# Patient Record
Sex: FEMALE | Race: BLACK OR AFRICAN AMERICAN
[De-identification: names, ages, dates, MRNs, and addresses within clinical notes are randomized per-mention and may not be internally consistent; named-entity substitution may affect disease eponyms.]

---

## 2021-05-06 ENCOUNTER — HOSPITAL ENCOUNTER (INPATIENT)
Dept: HOSPITAL 53 - M ED | Age: 21
LOS: 4 days | Discharge: HOME | DRG: 885 | End: 2021-05-10
Attending: PSYCHIATRY & NEUROLOGY | Admitting: PSYCHIATRY & NEUROLOGY
Payer: COMMERCIAL

## 2021-05-06 VITALS — HEIGHT: 65 IN | WEIGHT: 140.65 LBS | BODY MASS INDEX: 23.43 KG/M2

## 2021-05-06 DIAGNOSIS — Z79.899: ICD-10-CM

## 2021-05-06 DIAGNOSIS — T43.292A: ICD-10-CM

## 2021-05-06 DIAGNOSIS — F32.2: Primary | ICD-10-CM

## 2021-05-06 DIAGNOSIS — R25.2: ICD-10-CM

## 2021-05-06 DIAGNOSIS — Z20.822: ICD-10-CM

## 2021-05-06 DIAGNOSIS — Z91.048: ICD-10-CM

## 2021-05-06 DIAGNOSIS — Z88.2: ICD-10-CM

## 2021-05-06 DIAGNOSIS — Z63.4: ICD-10-CM

## 2021-05-06 LAB
ALBUMIN SERPL BCG-MCNC: 4.5 GM/DL (ref 3.2–5.2)
ALT SERPL W P-5'-P-CCNC: 18 U/L (ref 12–78)
AMPHETAMINES UR QL SCN: NEGATIVE
APAP SERPL-MCNC: < 2 UG/ML (ref 10–30)
B-HCG SERPL QL: NEGATIVE
BARBITURATES UR QL SCN: NEGATIVE
BASOPHILS # BLD AUTO: 0.1 10^3/UL (ref 0–0.2)
BASOPHILS NFR BLD AUTO: 0.5 % (ref 0–1)
BENZODIAZ UR QL SCN: NEGATIVE
BILIRUB CONJ SERPL-MCNC: 0.2 MG/DL (ref 0–0.2)
BILIRUB SERPL-MCNC: 0.5 MG/DL (ref 0.2–1)
BUN SERPL-MCNC: 10 MG/DL (ref 7–18)
BZE UR QL SCN: NEGATIVE
CALCIUM SERPL-MCNC: 9.8 MG/DL (ref 8.5–10.1)
CANNABINOIDS UR QL SCN: POSITIVE
CHLORIDE SERPL-SCNC: 107 MEQ/L (ref 98–107)
CK SERPL-CCNC: 172 U/L (ref 26–192)
CO2 SERPL-SCNC: 25 MEQ/L (ref 21–32)
CREAT SERPL-MCNC: 0.83 MG/DL (ref 0.55–1.3)
EOSINOPHIL # BLD AUTO: 0 10^3/UL (ref 0–0.5)
EOSINOPHIL NFR BLD AUTO: 0.4 % (ref 0–3)
ETHANOL SERPL-MCNC: < 0.003 % (ref 0–0.01)
GFR SERPL CREATININE-BSD FRML MDRD: > 60 ML/MIN/{1.73_M2} (ref 60–?)
GLUCOSE SERPL-MCNC: 85 MG/DL (ref 70–100)
HCT VFR BLD AUTO: 42.6 % (ref 36–47)
HGB BLD-MCNC: 14 G/DL (ref 12–15.5)
LYMPHOCYTES # BLD AUTO: 2.3 10^3/UL (ref 1.5–5)
LYMPHOCYTES NFR BLD AUTO: 22.5 % (ref 24–44)
MCH RBC QN AUTO: 29.6 PG (ref 27–33)
MCHC RBC AUTO-ENTMCNC: 32.9 G/DL (ref 32–36.5)
MCV RBC AUTO: 90.1 FL (ref 80–96)
METHADONE UR QL SCN: NEGATIVE
MONOCYTES # BLD AUTO: 0.9 10^3/UL (ref 0–0.8)
MONOCYTES NFR BLD AUTO: 8.4 % (ref 2–8)
NEUTROPHILS # BLD AUTO: 6.9 10^3/UL (ref 1.5–8.5)
NEUTROPHILS NFR BLD AUTO: 67.9 % (ref 36–66)
OPIATES UR QL SCN: NEGATIVE
PCP UR QL SCN: NEGATIVE
PLATELET # BLD AUTO: 233 10^3/UL (ref 150–450)
POTASSIUM SERPL-SCNC: 3.8 MEQ/L (ref 3.5–5.1)
PROT SERPL-MCNC: 8.3 GM/DL (ref 6.4–8.2)
RBC # BLD AUTO: 4.73 10^6/UL (ref 4–5.4)
RSV RNA NPH QL NAA+PROBE: NEGATIVE
SALICYLATES SERPL-MCNC: < 1.7 MG/DL (ref 5–30)
SODIUM SERPL-SCNC: 139 MEQ/L (ref 136–145)
TSH SERPL DL<=0.005 MIU/L-ACNC: 0.49 UIU/ML (ref 0.36–3.74)
WBC # BLD AUTO: 10.2 10^3/UL (ref 4–10)

## 2021-05-06 SDOH — SOCIAL STABILITY - SOCIAL INSECURITY: DISSAPEARANCE AND DEATH OF FAMILY MEMBER: Z63.4

## 2021-05-07 VITALS — DIASTOLIC BLOOD PRESSURE: 78 MMHG | SYSTOLIC BLOOD PRESSURE: 122 MMHG

## 2021-05-07 VITALS — SYSTOLIC BLOOD PRESSURE: 116 MMHG | DIASTOLIC BLOOD PRESSURE: 61 MMHG

## 2021-05-07 RX ADMIN — PRAZOSIN HYDROCHLORIDE SCH MG: 1 CAPSULE ORAL at 21:24

## 2021-05-07 NOTE — ECGEPIP
[facility] - ED

                                       

                                       Test Date:    2021

Pat Name:     MARIA DEL ROSARIO MCCULLOUGH               Department:   

Patient ID:   [id number]                 Room:         -

Gender:       Female                   Technician:   OSCAR

:          2000               Requested By: Margie Bose 

Order Number: [id number]     Reading MD:   Billy Kim

                                 Measurements

Intervals                              Axis          

Rate:         84                       P:            47

MO:           144                      QRS:          76

QRSD:         74                       T:            18

QT:           372                                    

QTc:          439                                    

                           Interpretive Statements

Normal sinus rhythm

Comparison tracing not on file

Electronically Signed on 2021 4:20:31 EDT by Billy Kim

## 2021-05-07 NOTE — HPEPDOC
General


Date of Admission


May 7, 2021 at 04:03


Date of Service:  May 7, 2021


Chief Complaint


The patient is a 20-year-old female admitted with a reason for visit of 

Unspecified Depressive Do.


Source:  Patient, RN/MD





History of Present Illness


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 

depression with suicidal attempt.  She had overdosed on Duloxetine DR . Took 15 

capsules. She has had several deaths in the family recently which has been  

making her very depressed over the last few days.  u tox positive for marijuana.

I am seeing the patient for medical history and physical. Patient complains of 

mild abdominal cramps. Has not had a bowel movement in 2 days.





Home Medications


Scheduled


Duloxetine Hcl (Duloxetine HCl) 20 Mg Capsule.dr, 20 MG PO BID, (Reported)


Folic Acid/Multivit-Min/Lutein (Multi-Vitamin Gummies) 1 Each Tab.chew, 2 CHW PO

DAILY, (Reported)





Scheduled PRN


Trazodone HCl (Trazodone HCl) 50 Mg Tablet, 50 MG PO QHS PRN for INSOMNIA, 

(Reported)





Allergies


Coded Allergies:  


     Sulfa (Sulfonamide Antibiotics) (Verified  Allergy, Intermediate, Hives, 

5/6/21)


Uncoded Allergies:  


     G6PD (Allergy, Unknown, 5/6/21)





Past Medical History


Medical History


depression


anxiety


PTSD


back pain


Scoliosis


Adjustment disorder





Family History


Significant Family History:  Cancer, Diabetes, Hypertension





Social History


* Smoker:  Denies


Alcohol:  Denies


Drugs:  denies





A-FIB/CHADSVASC


A-FIB History


Current/History of A-Fib/PAF?:  No





Review of Systems


Constitutional:  Denies: Chills, Fever, Night Sweats


Eyes:  Denies: Pain, Vision change


ENT:  Denies: Head Aches, Ear Pain, Dysphagia


Skin:  Denies: Rash, Lesions, Breakdown


Pulmonary:  Denies: Dyspnea, Cough


Cardiovascular:  Denies: Chest Pain, Palpitations, Orthopnea, Paroxysmal Noc. 

Dyspnea, Lt Headedness


Gastrointestinal:  Reports: Abdominal Pain, Constipation


Genitourinary:  Denies: Dysuria, Frequency, Incontinence, Retention


Hematologic:  Denies: Bruising, Bleeding Excessively





Physical Examination


General Exam:  Positive: Alert, Cooperative, No Acute Distress


Eye Exam:  Positive: PERRLA, Conjunctiva & lids normal, EOMI; 


   Negative: Sclera icteric


ENT Exam:  Positive: Atraumatic, Mucous membr. moist/pink, Pharynx Normal


Neck Exam:  Positive: Supple; 


   Negative: JVD, thyromegaly


Chest Exam:  Positive: Clear to auscultation, Normal air movement


Heart Exam:  Positive: Rate Normal, Regular Rhythm, Normal S1, Normal S2; 


   Negative: Murmurs, Rubs


Abdomen Exam:  Positive: Normal bowel sounds, Soft; 


   Negative: Tenderness, Hepatospenomegaly





Vital Signs





Vital Signs








  Date Time  Temp Pulse Resp B/P (MAP) Pulse Ox O2 Delivery O2 Flow Rate FiO2


 


5/7/21 05:32 97.7 63 16 122/78 (93) 99 Room Air  











Laboratory Data


Labs 24H


Laboratory Tests 2


5/6/21 19:34: 


Immature Granulocyte % (Auto) 0.3, Neutrophils (%) (Auto) 67.9H, Lymphocytes (%)

(Auto) 22.5L, Monocytes (%) (Auto) 8.4H, Eosinophils (%) (Auto) 0.4, Basophils 

(%) (Auto) 0.5, Neutrophils # (Auto) 6.9, Lymphocytes # (Auto) 2.3, Monocytes # 

(Auto) 0.9H, Eosinophils # (Auto) 0.0, Basophils # (Auto) 0.1, Nucleated Red 

Blood Cells % (auto) 0.0, Anion Gap 7L, Glomerular Filtration Rate > 60.0, 

Calcium Level 9.8, Total Bilirubin 0.5, Direct Bilirubin 0.2, Aspartate Amino 

Transf (AST/SGOT) 16, Alanine Aminotransferase (ALT/SGPT) 18, Alkaline 

Phosphatase 75, Total Creatine Kinase 172, Total Protein 8.3H, Albumin 4.5, 

Albumin/Globulin Ratio 1.2, Thyroid Stimulating Hormone (TSH) 0.486, Human 

Chorionic Gonadotropin, Qual NEGATIVE, Salicylates Level < 1.7L, Urine Opiates 

Screen NEGATIVE, Urine Methadone Screen NEGATIVE, Acetaminophen Level < 2.0L, 

Urine Barbiturates Screen NEGATIVE, Urine Phencyclidine Screen NEGATIVE, Urine 

Amphetamines Screen NEGATIVE, Urine Benzodiazepines Screen NEGATIVE, Urine 

Cocaine Metabolite Screen NEGATIVE, Urine Cannabinoids Screen POSITIVEH, Ethyl 

Alcohol Level < 0.003


5/6/21 19:37: Bedside Glucose (Misc Panel) 87


5/6/21 20:59: 


Coronavirus (COVID-19)(PCR) NEGATIVE, Influenza Type A (RT-PCR) NEGATIVE, I

nfluenza Type B (RT-PCR) NEGATIVE, Respiratory Syncytial Virus (PCR) NEGATIVE


CBC/BMP


Laboratory Tests


5/6/21 19:34











 Assessment/Plan


20 year old female admitted to [facility] for unspecified depression. u tox positive 

for marijuana. I am seeing the patient for medical history and physical. 





Depression with suicidal gesture


as per psychiatry





Abdominal cramps 


likely related to constipation


has tylenol and MOM orderd. She had refused it.





Plan / VTE


VTE Prophylaxis Ordered?:  No











ROBERTO CASANOVA MD                    May 7, 2021 13:32

## 2021-05-08 VITALS — DIASTOLIC BLOOD PRESSURE: 57 MMHG | SYSTOLIC BLOOD PRESSURE: 119 MMHG

## 2021-05-08 VITALS — DIASTOLIC BLOOD PRESSURE: 64 MMHG | SYSTOLIC BLOOD PRESSURE: 115 MMHG

## 2021-05-08 RX ADMIN — PRAZOSIN HYDROCHLORIDE SCH MG: 1 CAPSULE ORAL at 20:44

## 2021-05-08 RX ADMIN — DULOXETINE SCH MG: 20 CAPSULE, DELAYED RELEASE ORAL at 20:45

## 2021-05-08 NOTE — MHIPNPDOC
[facility] Progress Note


Progress Note


DATE OF SERVICE: 21





HISTORY: Asper previous records: "Patient is a 20 -year-old , 

female, who reports taking an overdose of 15 Cymbalta as a suicide attempt.  She

reports being very depressed over grief of several family members over the 

course of a year.  She reports having nightmares of seeing her dead family 

members.  








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 

admits to taking about "10-15 of my depression medications" earlier tonight with

intention of harming self. Pt is quite, guarded, fair eye contact, admits to 

feeling "very depressed" for past few days. Pt reports she has had several 

recent deaths in the family including her Uncle and her Grandmother "who raised 

me". Pt adds    "alot of family stress back home right now", denies any other 

stressors. Pt reports she took pills and then called family back home who then 

contacted [facility] M.P.'s. Pt denies prior attempts or psych admissions, admits to hx 

of PTSD but does not elaborate, denies HI/AH/VH, denies any substance abuse 

issues. PT denies any family hx of suicide, reports "PTSD" in the family."





VITAL SIGNS: See below.





NEW TEST RESULTS: See below





CURRENT MEDICATIONS: See below.





MENTAL STATUS EXAMINATION:


General Appearance:  well groomed, appears stated age, hospital scrubs/clothing


Build:  average


Demeanor:  withdrawn, depressed


Eye Contact:  fair


Activity:  slowed, depressed


Behavior:  cooperative, withdrawn


Speech:  low in volume


Mood:  depressed, anxious


Affect:  flat


Thought Process:  logical/linear


Thought Content (Delusions):  none reported


Thought Content (Other):  reports having nightmares about the  members 

of her family


Thought Content (Aggressive):  none reported


Perception (Hallucinations):  denies


Cognition (Impairment of):  none reported


Cognition(Intelligence Est.):  average


Oriented:  Awake, Alert, Oriented times three


Insight:  fair


Judgment:  Fair


Psychosis:  Denies





Diagnoses


Major Depressive Disorder, Single Episode, Severe


S/P Suicide Attempt by Overdose


 


ASSESSMENT:  Will increase Cymbalta to 20 mgs PO BID. She is experiencing a 

complicated bereavement, she has been traumatized by this events ( losing family

members), she came back recently from [place] where her family resides 

and she says that going there has affected her a lot. She reports trying to 

avoid places or conversations about this subject because is too painful. Will 

continue to monitor





MANAGEMENT PLAN: As above





TIME SPENT: 20 minutes.





Vital Signs





Vital Signs








  Date Time  Temp Pulse Resp B/P (MAP) Pulse Ox O2 Delivery O2 Flow Rate FiO2


 


21 06:46 99.5 79 16 119/57 (77) 99 Room Air  











Current Medications





Current Medications








 Medications


  (Trade)  Dose


 Ordered  Sig/Elza


 Route


 PRN Reason  Start Time


 Stop Time Status Last Admin


Dose Admin


 


 Acetaminophen


  (Tylenol Tab)  650 mg  Q6HP  PRN


 PO


 HEADACHE or DISCOMFORT  21 04:05


     





 


 Al Hydrox/Mg


 Hydrox/Simethicone


  (Mylanta)  30 ml  Q4HP  PRN


 PO


 HEARTBURN/INDIGESTION  21 04:05


     





 


 Duloxetine HCl


  (Cymbalta)  20 mg  DAILY


 PO


   21 09:00


    21 09:15





 


 Home Med


  (Med Rec


 Complete!)    ASDIRECTED


 XX


   21 03:45


 21 03:42 DC  





 


 Hydroxyzine HCl


  (Atarax)  50 mg  Q6H  PRN


 PO


 ANXIETY  21 16:45


     





 


 Magnesium


 Hydroxide


  (Milk Of


 Magnesia)  30 ml  DAILYPRN  PRN


 PO


 CONSTIPATION  21 04:05


     





 


 Prazosin HCl


  (Minipress)  2 mg  QHS


 PO


   21 21:00


    21 21:24





 


 Trazodone HCl


  (Desyrel)  50 mg  QHSP  PRN


 PO


 INSOMNIA  21 04:05


     














Allergies


Coded Allergies:  


     Sulfa (Sulfonamide Antibiotics) (Verified  Allergy, Intermediate, Hives, 

21)


Uncoded Allergies:  


     G6PD (Allergy, Unknown, 21)











GUNNAR MONSALVE MD              May 8, 2021 14:07

## 2021-05-09 VITALS — DIASTOLIC BLOOD PRESSURE: 69 MMHG | SYSTOLIC BLOOD PRESSURE: 109 MMHG

## 2021-05-09 VITALS — SYSTOLIC BLOOD PRESSURE: 109 MMHG | DIASTOLIC BLOOD PRESSURE: 69 MMHG

## 2021-05-09 RX ADMIN — DULOXETINE SCH MG: 20 CAPSULE, DELAYED RELEASE ORAL at 20:25

## 2021-05-09 RX ADMIN — DULOXETINE SCH MG: 20 CAPSULE, DELAYED RELEASE ORAL at 08:29

## 2021-05-09 RX ADMIN — PRAZOSIN HYDROCHLORIDE SCH MG: 1 CAPSULE ORAL at 20:25

## 2021-05-09 NOTE — MHIPNPDOC
[facility] Progress Note


Progress Note


DATE OF SERVICE: 21





HISTORY: Asper previous records: "Patient is a 20 -year-old , 

female, who reports taking an overdose of 15 Cymbalta as a suicide attempt.  She

reports being very depressed over grief of several family members over the 

course of a year.  She reports having nightmares of seeing her dead family 

members.  








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 

admits to taking about "10-15 of my depression medications" earlier tonight with

intention of harming self. Pt is quite, guarded, fair eye contact, admits to 

feeling "very depressed" for past few days. Pt reports she has had several 

recent deaths in the family including her Uncle and her Grandmother "who raised 

me". Pt adds    "alot of family stress back home right now", denies any other 

stressors. Pt reports she took pills and then called family back home who then 

contacted FD M.P.'s. Pt denies prior attempts or psych admissions, admits to hx 

of PTSD but does not elaborate, denies HI/AH/VH, denies any substance abuse 

issues. PT denies any family hx of suicide, reports "PTSD" in the family."





VITAL SIGNS: See below.





NEW TEST RESULTS: See below





CURRENT MEDICATIONS: See below.





MENTAL STATUS EXAMINATION:


General Appearance:  well groomed, appears stated age, hospital scrubs/clothing


Build:  average


Demeanor:  Less withdrawn, more interactive


Eye Contact:  fair


Activity:  Still a little bit slow but improving


Behavior:  cooperative, interacting with this writer, establishing rapport


Speech:  low in volume


Mood:  anxious


Affect:  Congruent with mood, a little bit more reactive, smiles at times


Thought Process:  logical/linear


Thought Content (Delusions):  none reported


Thought Content (Other): She  still reports having nightmares about the 

members of her family


Thought Content (Aggressive):  none reported


Perception (Hallucinations):  denies


Cognition (Impairment of):  none reported


Cognition(Intelligence Est.):  average


Oriented:  Awake, Alert, Oriented times three


Insight:  fair


Judgment:  Fair


Psychosis:  Denies





Diagnoses


Major Depressive Disorder, Single Episode, Severe


S/P Suicide Attempt by Overdose


 


ASSESSMENT:  We will continue with the same treatment plan, she is responding to

Cymbalta 20 mg twice a day which is the dose that she was taking before she 

attempted suicide with the same medication. She has expressed guilty thoughts 

and feelings about his attempted suicide and she says she will never try to do 

this again. She seems to be determined mind to overcome her psychiatric problem.





MANAGEMENT PLAN: As above





TIME SPENT: 20 minutes.





Vital Signs





Vital Signs








  Date Time  Temp Pulse Resp B/P (MAP) Pulse Ox O2 Delivery O2 Flow Rate FiO2


 


21 06:14 97.9 59 18 109/69 (82) 99 Room Air  











Current Medications





Current Medications








 Medications


  (Trade)  Dose


 Ordered  Sig/Elza


 Route


 PRN Reason  Start Time


 Stop Time Status Last Admin


Dose Admin


 


 Acetaminophen


  (Tylenol Tab)  650 mg  Q6HP  PRN


 PO


 HEADACHE or DISCOMFORT  21 04:05


     





 


 Al Hydrox/Mg


 Hydrox/Simethicone


  (Mylanta)  30 ml  Q4HP  PRN


 PO


 HEARTBURN/INDIGESTION  21 04:05


     





 


 Duloxetine HCl


  (Cymbalta)  20 mg  BID


 PO


   21 21:00


    21 08:29





 


 Duloxetine HCl


  (Cymbalta)  20 mg  DAILY


 PO


   21 09:00


 21 14:17 DC 21 09:15





 


 Home Med


  (Med Rec


 Complete!)    ASDIRECTED


 XX


   21 03:45


 21 03:42 DC  





 


 Hydroxyzine HCl


  (Atarax)  50 mg  Q6H  PRN


 PO


 ANXIETY  21 16:45


     





 


 Magnesium


 Hydroxide


  (Milk Of


 Magnesia)  30 ml  DAILYPRN  PRN


 PO


 CONSTIPATION  21 04:05


     





 


 Prazosin HCl


  (Minipress)  2 mg  QHS


 PO


   21 21:00


    21 20:44





 


 Trazodone HCl


  (Desyrel)  50 mg  QHSP  PRN


 PO


 INSOMNIA  21 04:05


    21 20:44














Allergies


Coded Allergies:  


     Sulfa (Sulfonamide Antibiotics) (Verified  Allergy, Intermediate, Hives, 

21)


Uncoded Allergies:  


     G6PD (Allergy, Unknown, 21)











GUNNAR MONSALVE MD              May 9, 2021 16:55

## 2021-05-10 VITALS — DIASTOLIC BLOOD PRESSURE: 69 MMHG | SYSTOLIC BLOOD PRESSURE: 103 MMHG

## 2021-05-10 RX ADMIN — DULOXETINE SCH MG: 20 CAPSULE, DELAYED RELEASE ORAL at 08:07

## 2021-05-10 NOTE — MHDSPDOC
[facility] Discharge Summary


Discharge Summary


DATE OF ADMISSION: May 7, 2021 at 04:03 


DATE OF DISCHARGE: May 10, 2021 at 1008





DISCHARGE DIAGNOSES:


Major Depressive Disorder, Single Episode, Severe


S/P Suicide Attempt by Overdose





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 

, female, who reports taking an overdose of 15 Cymbalta as a 

suicide attempt "I took an overdose because everything was hitting me all at 

once."  She reports being very depressed over grief of several family members 

over the course of a year.  She reports having nightmares of seeing her dead 

family members.   





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 

admits to taking about "10-15 of my depression medications" earlier tonight with

intention of harming self. Pt is quite, guarded, fair eye contact, admits to 

feeling "very depressed" for past few days. Pt reports she has had several 

recent deaths in the family including her Uncle and her Grandmother "who raised 

me". Pt adds    "alot of family stress back home right now", denies any other 

stressors. Pt reports she took pills and then called family back home who then 

contacted [facility] M.P[facility]s. Pt denies prior attempts or psych admissions, admits to hx 

of PTSD but does not elaborate, denies HI/AH/VH, denies any substance abuse 

issues. PT denies any family hx of suicide, reports "PTSD" in the family.





VITALS: See below





CONSULTANTS INVOLVED: :  See H + P by Hospitalist





TREATMENT AND PROGRESS ON THE UNIT : Patient was admitted to the [facility] on a 9.39 

legal status he was afforded the following treatment modalities:


1) Individual Therapy


2) Group Therapy


3) Medication Management


4) Milieu Therapy


5) Safe Environment





HOSPITAL COURSE:  Patient was admitted to [facility] on a 9.39 legal status.  She had 

reported that she was having increased depression and anxiety due to the loss of

another family member.  She has lost 3 family members over the past year.  She 

reported that she had overdosed on 15 cymbalta as a suicide attempt. Patient was

agreeable to restarting Cymbalta.  This was increased to BID over the weekend.  

She had also complained of nightmares and she was Rx'd prazosin for this.  Today

she was interviewed,  She presented with bright mood and affect.  She reports a 

decrease in depression, anxiety, denies nightmares, reports that she is sleeping

well.  "I am in a much better head space than when I came in."  She states that 

she has support persons that she can utilize (mother, cousin, friends and [facility])  Her plan moving forward is to write (journal) and to [employer].  She 

had not been using any coping skills due to her bereavement. 





DISCHARGE ASSESSMENT:  In today's interview, patient is alert and oriented, pts

dress is appropriate.  Hygiene and grooming is well-kempt.  Smiles on approach 

and is pleasant and engaged in the interview.  Denies depression and anxiety.  

Denies suicidal and homicidal ideation, planning or intent.  Denies and is not 

observed with agnieszka, psychotic symptoms of delusions, bizarre thinking, 

obsessions, paranoia, ruminations illogical thoughts, flight of ideas or having 

poor insight and judgment.  Patient has normal mentation, declines further 

hospitalization on a voluntary status and meets criteria for discharge today.  





MENTAL STATUS EXAMINATION ON DISCHARGE: 


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 

female, who reports taking an overdose of 15 Cymbalta as a suicide attempt 


Speech: Is clear, regular rate and volume


Language skills are intact


Thought processes including: Logical


Thought content: Reports decreased depression and anxiety.  Denies SI. Denies 

any psychotic symptoms


Description of abnormal or psychotic thoughts: no AH/VH


Judgment: Good


Insight: Good


Orientation: Awake, Alert and oriented X3


Recent and remote memory: Intact


Attention span and concentration: Average


Language: Intact


Fund of knowledge: Average


Mood: Euthymic Affect: Full.





MEDICATIONS ON DISCHARGE:


See Discharge Reconciliation





PLAN/FOLLOWUP ARRANGEMENTS: Patient being discharged to [facility] and 

following up with [facility]





The amount of time spent in the coordination of care for this patient was 

approximately 25 minutes.





ETOH/Disorder Med Rx


ETOH/DRUG DISORDER RX:  N/A





Vital Signs/I&Os





Vital Signs








  Date Time  Temp Pulse Resp B/P (MAP) Pulse Ox O2 Delivery O2 Flow Rate FiO2


 


5/10/21 06:49 98.9 89 20 103/69 (80) 89 Room Air  











Medications


Scheduled


Duloxetine Hcl (Duloxetine HCl) 20 Mg Capsule., 20 MG PO BID for Depression, 

#14


Folic Acid/Multivit-Min/Lutein (Multi-Vitamin Gummies) 1 Each Tab.chew, 2 CHW PO

DAILY, (Reported)


Prazosin HCl (Minipress) 1 Mg Capsule, 2 MG PO QHS for Nightmares, #7





Scheduled PRN


Trazodone HCl (Trazodone HCl) 50 Mg Tablet, 50 MG PO QHS PRN for INSOMNIA, 

(Reported)





Allergies


Coded Allergies:  


     Sulfa (Sulfonamide Antibiotics) (Verified  Allergy, Intermediate, Hives, 

5/6/21)


Uncoded Allergies:  


     G6PD (Allergy, Unknown, 5/6/21)











DIMAS YOUSSEF NP              May 10, 2021 10:08

## 2021-08-19 ENCOUNTER — HOSPITAL ENCOUNTER (OUTPATIENT)
Dept: HOSPITAL 53 - M SFHCRHEU | Age: 21
End: 2021-08-19
Attending: INTERNAL MEDICINE
Payer: COMMERCIAL

## 2021-08-19 DIAGNOSIS — M79.10: Primary | ICD-10-CM

## 2021-08-19 LAB
25(OH)D3 SERPL-MCNC: 8.7 NG/ML (ref 30–100)
CK SERPL-CCNC: 232 U/L (ref 26–192)
IRON SERPL-MCNC: 99 UG/DL (ref 50–170)
MAGNESIUM SERPL-MCNC: 2.3 MG/DL (ref 1.8–2.4)
PHOSPHATE SERPL-MCNC: 4 MG/DL (ref 2.5–4.9)
VIT B12 SERPL-MCNC: 1559 PG/ML (ref 247–911)

## 2021-08-19 PROCEDURE — 36415 COLL VENOUS BLD VENIPUNCTURE: CPT

## 2021-08-19 PROCEDURE — 82550 ASSAY OF CK (CPK): CPT

## 2021-08-19 PROCEDURE — 84100 ASSAY OF PHOSPHORUS: CPT

## 2021-08-19 PROCEDURE — 83735 ASSAY OF MAGNESIUM: CPT

## 2021-08-19 PROCEDURE — 82306 VITAMIN D 25 HYDROXY: CPT

## 2021-08-19 PROCEDURE — 82607 VITAMIN B-12: CPT

## 2021-08-19 PROCEDURE — 83540 ASSAY OF IRON: CPT

## 2021-12-15 ENCOUNTER — HOSPITAL ENCOUNTER (OUTPATIENT)
Dept: HOSPITAL 53 - M PLAIMG | Age: 21
End: 2021-12-15
Attending: INTERNAL MEDICINE

## 2021-12-15 DIAGNOSIS — M79.642: ICD-10-CM

## 2021-12-15 DIAGNOSIS — M41.9: Primary | ICD-10-CM

## 2021-12-15 DIAGNOSIS — R06.02: ICD-10-CM

## 2021-12-15 DIAGNOSIS — M79.641: ICD-10-CM

## 2021-12-15 NOTE — REP
INDICATION:

SOB/PAIN



COMPARISON:

None.



TECHNIQUE:

PA and lateral.



FINDINGS:

The mediastinum and cardiac silhouette are normal.  The lung fields are clear and

without acute consolidation, effusion, or pneumothorax.  The skeletal structures are

intact and normal.



IMPRESSION:

No acute cardiopulmonary process.





<Electronically signed by Flo Barton > 12/15/21 3602

## 2021-12-15 NOTE — REP
INDICATION:

SOB/PAIN



COMPARISON:

None.



TECHNIQUE:

AP, lateral, coned-down views of the lumbar spine.



FINDINGS:

Levoconvex scoliosis is suggested.  Alignment is maintained in the sagittal plane.

There is no evidence for acute fracture/compression injury or subluxation.  Disc

spaces appear maintained.



IMPRESSION:

1. Levoconvex scoliosis suggested.

2. Disc spaces are normal.





<Electronically signed by Flo Barton > 12/15/21 [id number]

## 2021-12-15 NOTE — REP
INDICATION:

SOB/PAIN



COMPARISON:

None.



TECHNIQUE:

AP, lateral, and swimmers views.



FINDINGS:

Frontal view demonstrates approximately 22 degrees of levoconvex scoliosis as measured

from the superior endplate of T9 to the superior endplate of L3.  Normal alignment is

maintained in the sagittal plane.  There is no evidence for acute fracture/compression

injury.



IMPRESSION:

Scoliosis.





<Electronically signed by Flo Barton > 12/15/21 [id number]

## 2021-12-15 NOTE — REP
INDICATION:

SOB/PAIN



COMPARISON:

None.



TECHNIQUE:

AP, lateral, bilateral oblique views .



FINDINGS:

The osseous structures, joint spaces, and surrounding soft tissues are bilaterally

symmetric and normal.  No obvious arthritic/degenerative or congenital abnormalities

are appreciated.  No evidence for acute or obvious healed injury..



IMPRESSION:

Normal bilateral hand radiograph series.





<Electronically signed by Flo Barton > 12/15/21 [id number]

## 2022-01-09 ENCOUNTER — HOSPITAL ENCOUNTER (EMERGENCY)
Dept: HOSPITAL 53 - M ED | Age: 22
LOS: 1 days | Discharge: HOME | End: 2022-01-10
Payer: OTHER GOVERNMENT

## 2022-01-09 VITALS — BODY MASS INDEX: 20.13 KG/M2 | WEIGHT: 125.27 LBS | HEIGHT: 66 IN

## 2022-01-09 VITALS — DIASTOLIC BLOOD PRESSURE: 78 MMHG | SYSTOLIC BLOOD PRESSURE: 113 MMHG

## 2022-01-09 VITALS — OXYGEN SATURATION: 100 %

## 2022-01-09 DIAGNOSIS — Z88.2: ICD-10-CM

## 2022-01-09 DIAGNOSIS — K21.9: ICD-10-CM

## 2022-01-09 DIAGNOSIS — Z79.899: ICD-10-CM

## 2022-01-09 DIAGNOSIS — M41.9: ICD-10-CM

## 2022-01-09 DIAGNOSIS — F32.A: ICD-10-CM

## 2022-01-09 DIAGNOSIS — U07.1: Primary | ICD-10-CM

## 2022-01-09 DIAGNOSIS — R00.2: ICD-10-CM

## 2022-01-09 DIAGNOSIS — F43.10: ICD-10-CM
